# Patient Record
Sex: FEMALE | Race: OTHER | HISPANIC OR LATINO | URBAN - METROPOLITAN AREA
[De-identification: names, ages, dates, MRNs, and addresses within clinical notes are randomized per-mention and may not be internally consistent; named-entity substitution may affect disease eponyms.]

---

## 2019-06-17 ENCOUNTER — EMERGENCY (EMERGENCY)
Facility: HOSPITAL | Age: 24
LOS: 1 days | Discharge: HOME | End: 2019-06-17
Attending: EMERGENCY MEDICINE
Payer: SELF-PAY

## 2019-06-17 VITALS
HEIGHT: 64 IN | DIASTOLIC BLOOD PRESSURE: 83 MMHG | SYSTOLIC BLOOD PRESSURE: 131 MMHG | HEART RATE: 68 BPM | OXYGEN SATURATION: 100 % | WEIGHT: 167.99 LBS | TEMPERATURE: 98 F | RESPIRATION RATE: 18 BRPM

## 2019-06-17 PROCEDURE — 99053 MED SERV 10PM-8AM 24 HR FAC: CPT

## 2019-06-17 PROCEDURE — 99284 EMERGENCY DEPT VISIT MOD MDM: CPT | Mod: 25

## 2019-06-18 VITALS
OXYGEN SATURATION: 99 % | TEMPERATURE: 98 F | HEART RATE: 57 BPM | SYSTOLIC BLOOD PRESSURE: 110 MMHG | DIASTOLIC BLOOD PRESSURE: 53 MMHG | RESPIRATION RATE: 17 BRPM

## 2019-06-18 LAB
ALBUMIN SERPL ELPH-MCNC: 4.8 G/DL — SIGNIFICANT CHANGE UP (ref 3.3–5)
ALP SERPL-CCNC: 85 U/L — SIGNIFICANT CHANGE UP (ref 40–120)
ALT FLD-CCNC: 14 U/L — SIGNIFICANT CHANGE UP (ref 10–45)
ANION GAP SERPL CALC-SCNC: 14 MMOL/L — SIGNIFICANT CHANGE UP (ref 5–17)
AST SERPL-CCNC: 18 U/L — SIGNIFICANT CHANGE UP (ref 10–40)
BASOPHILS # BLD AUTO: 0 K/UL — SIGNIFICANT CHANGE UP (ref 0–0.2)
BASOPHILS NFR BLD AUTO: 0.7 % — SIGNIFICANT CHANGE UP (ref 0–2)
BILIRUB SERPL-MCNC: 0.2 MG/DL — SIGNIFICANT CHANGE UP (ref 0.2–1.2)
BUN SERPL-MCNC: 7 MG/DL — SIGNIFICANT CHANGE UP (ref 7–23)
CALCIUM SERPL-MCNC: 9.3 MG/DL — SIGNIFICANT CHANGE UP (ref 8.4–10.5)
CHLORIDE SERPL-SCNC: 104 MMOL/L — SIGNIFICANT CHANGE UP (ref 96–108)
CO2 SERPL-SCNC: 22 MMOL/L — SIGNIFICANT CHANGE UP (ref 22–31)
CREAT SERPL-MCNC: 0.52 MG/DL — SIGNIFICANT CHANGE UP (ref 0.5–1.3)
EOSINOPHIL # BLD AUTO: 0.3 K/UL — SIGNIFICANT CHANGE UP (ref 0–0.5)
EOSINOPHIL NFR BLD AUTO: 4.4 % — SIGNIFICANT CHANGE UP (ref 0–6)
GLUCOSE SERPL-MCNC: 95 MG/DL — SIGNIFICANT CHANGE UP (ref 70–99)
HCG SERPL-ACNC: <2 MIU/ML — SIGNIFICANT CHANGE UP
HCT VFR BLD CALC: 36.1 % — SIGNIFICANT CHANGE UP (ref 34.5–45)
HGB BLD-MCNC: 12.2 G/DL — SIGNIFICANT CHANGE UP (ref 11.5–15.5)
LYMPHOCYTES # BLD AUTO: 2.2 K/UL — SIGNIFICANT CHANGE UP (ref 1–3.3)
LYMPHOCYTES # BLD AUTO: 33.2 % — SIGNIFICANT CHANGE UP (ref 13–44)
MCHC RBC-ENTMCNC: 27.8 PG — SIGNIFICANT CHANGE UP (ref 27–34)
MCHC RBC-ENTMCNC: 34 GM/DL — SIGNIFICANT CHANGE UP (ref 32–36)
MCV RBC AUTO: 81.9 FL — SIGNIFICANT CHANGE UP (ref 80–100)
MONOCYTES # BLD AUTO: 0.5 K/UL — SIGNIFICANT CHANGE UP (ref 0–0.9)
MONOCYTES NFR BLD AUTO: 7.6 % — SIGNIFICANT CHANGE UP (ref 2–14)
NEUTROPHILS # BLD AUTO: 3.6 K/UL — SIGNIFICANT CHANGE UP (ref 1.8–7.4)
NEUTROPHILS NFR BLD AUTO: 54.1 % — SIGNIFICANT CHANGE UP (ref 43–77)
PLATELET # BLD AUTO: 202 K/UL — SIGNIFICANT CHANGE UP (ref 150–400)
POTASSIUM SERPL-MCNC: 3.4 MMOL/L — LOW (ref 3.5–5.3)
POTASSIUM SERPL-SCNC: 3.4 MMOL/L — LOW (ref 3.5–5.3)
PROT SERPL-MCNC: 7.7 G/DL — SIGNIFICANT CHANGE UP (ref 6–8.3)
RBC # BLD: 4.4 M/UL — SIGNIFICANT CHANGE UP (ref 3.8–5.2)
RBC # FLD: 13.7 % — SIGNIFICANT CHANGE UP (ref 10.3–14.5)
SODIUM SERPL-SCNC: 140 MMOL/L — SIGNIFICANT CHANGE UP (ref 135–145)
WBC # BLD: 6.6 K/UL — SIGNIFICANT CHANGE UP (ref 3.8–10.5)
WBC # FLD AUTO: 6.6 K/UL — SIGNIFICANT CHANGE UP (ref 3.8–10.5)

## 2019-06-18 PROCEDURE — 76830 TRANSVAGINAL US NON-OB: CPT | Mod: 26

## 2019-06-18 PROCEDURE — 93975 VASCULAR STUDY: CPT | Mod: 26

## 2019-06-18 RX ORDER — KETOROLAC TROMETHAMINE 30 MG/ML
30 SYRINGE (ML) INJECTION ONCE
Refills: 0 | Status: DISCONTINUED | OUTPATIENT
Start: 2019-06-18 | End: 2019-06-18

## 2019-06-18 RX ADMIN — Medication 30 MILLIGRAM(S): at 01:59

## 2019-06-18 NOTE — ED PROVIDER NOTE - ATTENDING CONTRIBUTION TO CARE
Attending MD Adriana Gregorio:  I personally have seen and examined this patient.  Resident note reviewed and agree on plan of care and except where noted.  See HPI, PE, and MDM for details.

## 2019-06-18 NOTE — ED PROVIDER NOTE - MUSCULOSKELETAL, MLM
Strength appropriate for age and habitus. Full active range of motion of all 4 extremities. No joint swelling, calor, or deformities. No calf swelling or tenderness. No C-Spine tenderness.

## 2019-06-18 NOTE — ED PROVIDER NOTE - PHYSICAL EXAMINATION
Attending Adriana Gregorio: Gen: NAD, heent: atrauamtic, eomi, perrla, mmm, op pink, uvula midline, neck; nttp, no nuchal rigidity, chest: nttp, no crepitus, cv: rrr, no murmurs, lungs: ctab, abd: soft, mild ttp rlq, no murphys, negative mcburrneys, no peritoneal signs, +BS, no guarding, ext: wwp, neg homans, skin: no rash, neuro: awake and alert, following commands, speech clear, sensation and strength intact, no focal deficits

## 2019-06-18 NOTE — ED ADULT NURSE NOTE - NSIMPLEMENTINTERV_GEN_ALL_ED
Implemented All Fall Risk Interventions:  [place] to call system. Call bell, personal items and telephone within reach. Instruct patient to call for assistance. Room bathroom lighting operational. Non-slip footwear when patient is off stretcher. Physically safe environment: no spills, clutter or unnecessary equipment. Stretcher in lowest position, wheels locked, appropriate side rails in place. Provide visual cue, wrist band, yellow gown, etc. Monitor gait and stability. Monitor for mental status changes and reorient to person, place, and time. Review medications for side effects contributing to fall risk. Reinforce activity limits and safety measures with patient and family.

## 2019-06-18 NOTE — ED PROVIDER NOTE - OBJECTIVE STATEMENT
23 y.o female no PMH presents with syncope. Per patient, been having some severe right lower quadrant pain all day. At 9 pm, pain was severe, she felt lightheaded and had syncopal event while sitting in chair, witnessed by friends. Did not hit head. No CP or palpitations prior to the fall. States came to ED now because of persistent pain in the right lower quadrant. Pain sharp, non radiating. States that she has a histroy of "inflamed ovaries." LMP May 16th.  Took ibuprofen w/ mild relief. Pain right now 5/10. No vaginal discharge or odor. No family hx of sudden cardiac death. No hx of DVT or PE. Has an IUD not on OCP. No fevers, N/V/D. Pain been going on for many weeks.

## 2019-06-18 NOTE — ED PROVIDER NOTE - NSFOLLOWUPINSTRUCTIONS_ED_ALL_ED_FT
1) Please return to the ED should you have any new or worsening symptoms, worsening pain, develop chest pain, difficulty breathing, or fever, or any concerning symptoms  2) Please follow up with your primary care doctor in 2-3 days.

## 2019-06-18 NOTE — ED ADULT NURSE NOTE - OBJECTIVE STATEMENT
pt reports "fainting" at about 9:30 pm while with friends; was standing; reports feeling dizzy and weak prior to fainting; denies headstrike; has severe RLQ pain x 2 days; was mild for one month; hx inflammation of right ovary; LMP May 16th; Has IUD in place; pt reports her ankles seem swollen; EKG done in waiting room and placed on tele box upon arrival to unit; pt is alert and oriented x 3 and in no acute distress.

## 2019-06-18 NOTE — ED ADULT NURSE REASSESSMENT NOTE - NS ED NURSE REASSESS COMMENT FT1
Pt returned from US. Appears to be in no current distress. Pt aware urine is needed at this time. Awaiting US results. Safety and comfort measures maintained.

## 2019-06-18 NOTE — ED ADULT NURSE REASSESSMENT NOTE - NS ED NURSE REASSESS COMMENT FT1
Report received from TORO Torres. Upon reassessment pt assisted to bathroom and back. Transport at bedside to take pt to ultrasound. Will reassess upon return of pt to ED. Safety and comfort measures maintained. Pt aware of admission. Report received from TORO Torres. Transport at bedside to take pt to ultrasound. Will reassess upon return of pt to ED.

## 2019-06-18 NOTE — ED PROVIDER NOTE - CLINICAL SUMMARY MEDICAL DECISION MAKING FREE TEXT BOX
Angelito Estevez (Resident): few weeks of intermittent right lower quadrant pain - syncope today while having the pain, no prodrome of symptoms prior to the syncope other than lightheadedness, no CP, no reason for DVT or PE, PERC Negative - concern for possible ovarian pathology including torsion vs cyst rupture - will check labs, TVUS, and reassess - story more c/w ovarian than appy given no secondary signs of appendicitis and intermittent for some time Angelito Estevez (Resident): few weeks of intermittent right lower quadrant pain - syncope today while having the pain, no prodrome of symptoms prior to the syncope other than lightheadedness, no CP, no reason for DVT or PE, PERC Negative - concern for possible ovarian pathology including torsion vs cyst rupture - will check labs, TVUS, and reassess - story more c/w ovarian than appy given no secondary signs of appendicitis and intermittent for some time  Attending Adriana Gregorio: 22 y/o female presenting with lower abdominal pain. no vaginal discharge or h/o STD. pt with RLQ pain. less likely appendicitis. consider ovarian cyst as cause as well. no hematuria or h/o uretaral colic. will obtain labs, tvus and serial abdominal exams. consider ct if persistentyly tender to evaluate for appendicitis

## 2019-06-18 NOTE — ED PROVIDER NOTE - PROGRESS NOTE DETAILS
Angelito Estevez (Resident): pain improved - still mildly tender - TVUS WNl - d/w patient can not rule out appy w/o a CT but given no N/V fever and normal WBC, very low concern - reviewed precautions about appendicitis and torsion with patient and if any worsening symptoms to return to the ED - safe to d/c home - given chance to ask questions - gave copy of results - EKG WNL, no syncope or events on monitor since being here, likely was 2/2 to the pain Attending Adriana Gregorio: u/s negative. on repeat exam abd soft, d/w pt appendicitis precautions. pt ambujlating without any pain. low risk for PID and no vaginal discharge. given close return precatuions for any changes or worsening

## 2020-04-09 ENCOUNTER — EMERGENCY (EMERGENCY)
Facility: HOSPITAL | Age: 25
LOS: 1 days | Discharge: HOME | End: 2020-04-09
Attending: EMERGENCY MEDICINE
Payer: MEDICAID

## 2020-04-09 VITALS
OXYGEN SATURATION: 95 % | RESPIRATION RATE: 22 BRPM | HEART RATE: 130 BPM | HEIGHT: 64 IN | SYSTOLIC BLOOD PRESSURE: 146 MMHG | WEIGHT: 175.05 LBS | DIASTOLIC BLOOD PRESSURE: 82 MMHG | TEMPERATURE: 101 F

## 2020-04-09 VITALS
HEART RATE: 84 BPM | TEMPERATURE: 99 F | SYSTOLIC BLOOD PRESSURE: 110 MMHG | OXYGEN SATURATION: 99 % | DIASTOLIC BLOOD PRESSURE: 65 MMHG | RESPIRATION RATE: 22 BRPM

## 2020-04-09 PROCEDURE — 99284 EMERGENCY DEPT VISIT MOD MDM: CPT

## 2020-04-09 PROCEDURE — 87635 SARS-COV-2 COVID-19 AMP PRB: CPT

## 2020-04-09 RX ORDER — ACETAMINOPHEN 500 MG
2 TABLET ORAL
Qty: 112 | Refills: 0
Start: 2020-04-09 | End: 2020-04-22

## 2020-04-09 RX ORDER — ACETAMINOPHEN 500 MG
975 TABLET ORAL ONCE
Refills: 0 | Status: COMPLETED | OUTPATIENT
Start: 2020-04-09 | End: 2020-04-09

## 2020-04-09 RX ADMIN — Medication 975 MILLIGRAM(S): at 22:09

## 2020-04-09 NOTE — ED PROVIDER NOTE - PHYSICAL EXAMINATION
CONSTITUTIONAL: Nontoxic, well nourished, well developed, young female, anxious   HEAD: Normocephalic; atraumatic  EYES: Normal inspection, EOMI  ENMT: External appears normal; normal oropharynx  NECK: Supple; non-tender; no cervical lymphadenopathy  CARD: tachycardic, no audible murmurs, rubs, or gallops  RESP: mild respiratory distress, lungs ctab/l, 97% on RA, ambulating sat 95%   ABD: Soft, non-distended; non-tender; no rebound or guarding  EXT: No LE pitting edema or calf tenderness; distal pulses intact with good capillary refill  SKIN: Warm, dry, intact  NEURO: aaox3, moving all extremities spontaneously

## 2020-04-09 NOTE — ED PROVIDER NOTE - CCCP TRG CHIEF CMPLNT
How Severe Are Your Spot(S)?: mild
What Is The Reason For Today's Visit?: Full Body Skin Examination
What Is The Reason For Today's Visit? (Being Monitored For X): the risk of recurrence of previously treated lesion(s)
Additional History: No concerns. FBE.
shortness of breath

## 2020-04-09 NOTE — ED PROVIDER NOTE - ATTENDING CONTRIBUTION TO CARE
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 Febrile, tachycardic. Will treat fever, swab for COVID, dc with strict return precautions

## 2020-04-09 NOTE — ED PROVIDER NOTE - CLINICAL SUMMARY MEDICAL DECISION MAKING FREE TEXT BOX
Sydni Avilez MD: 24yo F with no significant PMH who presents with a fever, cough, shortness of breath and body aches over the last 3 days. [employer] employee. Pt is tachycardic and in mild respiratory distress but not hypoxic at rest or ambulating. Will get COVID swab, treat fever with tylenol and reassess.

## 2020-04-09 NOTE — ED PROVIDER NOTE - NS ED ROS FT
General: +fever, chills  HENT: denies nasal congestion, sore throat, rhinorrhea  Eyes: denies vision changes  CV: denies chest pain  Resp: +difficulty breathing, +cough  Abdominal: +nausea, vomiting, diarrhea, abdominal pain, blood in stool, dark stool  : denies pain with urination  MSK: denies recent trauma  Neuro: denies headaches, numbness, tingling, dizziness, lightheadedness.  Skin: denies new rashes  Endocrine: denies recent weight loss

## 2020-04-09 NOTE — ED PROVIDER NOTE - PATIENT PORTAL LINK FT
You can access the FollowMyHealth Patient Portal offered by [facility] by registering at the following website: [email or web address]. By joining [facility]’s FollowMyHealth portal, you will also be able to view your health information using other applications (apps) compatible with our system.

## 2020-04-09 NOTE — ED PROVIDER NOTE - NSFOLLOWUPINSTRUCTIONS_ED_ALL_ED_FT
1. You were seen in the ED and underwent testing for the novel coronarvirus (COVID-19). The results are not back yet and you will be contacted with the result which should return in 24-48 hours, but may take longer. You were also tested for other common viruses such as the flu and cold viruses. You will be notified if you test positive for any of these.    2. Until your test results are back, YOU MUST SELF-QUARANTINE until you are told to other otherwise by [facility] or the local [facility] department. This is extremely important to limit the spread of this virus. Please refer closely to the packet provided to you on the specifics of the process of self-quarantine.    3. If you end up testing positive for the virus, you will instructed as to when you can return to your usual activities. If you do not hear from anyone in 7 days, please call the main hospital number or your local health department for guidance.     4. Return to the ED for difficulty breathing.    5. You may over the counter acetaminophen (Tylenol) 650mg every 6 hours as needed for fever or pain. There is some concern in the medical community about using ibuprofen (Advil, Motrin) and other NSAIDs in people with COVID infections and until there is more research on this subject it may be best to avoid NSAIDs like ibuprofen at the moment unless you have an allergy to acetaminophen (Tylenol).  Do NOT exceed 3500mg acetaminophen in 24 hours.  Please do not take these medications if you do not have pain or fever or if you have any history of liver disease.     -------------    What is a coronavirus?  Coronaviruses are a large family of viruses that cause illnesses ranging from the common cold to more severe diseases such as Middle East Respiratory Syndrome (MERS) and Severe Acute Respiratory Syndrome (SARS).    What is Novel Coronavirus (COVID-19)?  The Centers for Disease Control and Prevention (CDC) is closely monitoring the outbreak caused by COVID-19. For the latest information about COVID-19, visit the CDC website at CDC.gov/Coronavirus    How are coronaviruses spread?  Coronaviruses can be transmitted from person-toperson, usually after close contact with an infected  person (for example, in a household, workplace, or healthcare setting), via droplets that become airborne after a cough or sneeze. These droplets can then infect a nearby person. Transmission can also occur by touching recently contaminated surfaces.    Is there a treatment for a COVID-19?  There is no specific treatment for disease caused by COVID-19. However, many of the symptoms can be treated based on the patient’s clinical condition. Supportive care for infected persons can be highly effective.    What are the symptoms of coronavirus infection?  It depends on the virus, but common signs include fever and/or respiratory symptoms such as cough and shortness of breath. In more severe cases, infection can cause pneumonia, severe acute respiratory syndrome, kidney failure and even death. Fortunately, most cases of COVID-19 have an illness no different than the influenza (flu), with a majority of these patients having mild symptoms and overall mortality which appears to be not much different than the flu.    What can I do to protect myself?  The best precautionary measures:  – washing your hands  – covering your cough  – disinfecting surfaces  – it is also advisable to avoid close contact with anyone showing symptoms of respiratory illness such as coughing and sneezing  – those with symptoms should wear a surgical mask when around others    What can I do to protect those around me?  If you have been identified as someone who may be infected with COVID-19, we recommend you follow the self-isolation procedures outlined on the following page to protect those around you and to limit the spread of this virus.    We recommend the below precautionary steps from now until 14 days from when you returned from your travel or date of your last known possible contact:    — Do not go to work, school or public areas. Avoid using public transportation, ridesharing or taxis.  — As much as possible, separate yourself from other people in your home. If you can, you should stay in a room and away from other people. Also, you should use a separate bathroom if available.  — Wear the supplied mask whenever you are around other people.  — If you have a non-urgent medical appointment, please reschedule for a later date. If the appointment is urgent, please call the health care provider and tell them that you are on self-isolation for possible COVID-19. This will help the health care provider’s office take steps to keep other people from getting infected or exposed. If you can reschedule routine appointments, do so.  — Wash your hands often with soap and water for at least 15 to 20 seconds or clean your hands with an alcohol-based hand  that contains 60 to 95% alcohol, covering all surfaces of your hands and rubbing them together until they feel dry. Soap and water should be used preferentially if hands are visibly dirty.  — Cover your mouth and nose with a tissue when you cough or sneeze. Throw used tissues in a lined trash can. Immediately wash your hands.  — Avoid touching your eyes, nose, and mouth with your hands.  — Avoid sharing personal household items. You should not share dishes, drinking glasses, cups, eating utensils, towels, or bedding with other people or pets in your home. After using these items, they should be washed thoroughly with soap and water.  — Clean and disinfect all “high-touch” surfaces every day. High touch surfaces include counters, tabletops, doorknobs, light switches, remote controls, bathroom fixtures, toilets, phones, keyboards, tablets, and bedside tables. Also, clean any surfaces that may have blood, stool, or body fluids on them.    ------------------------------------------  Information for patients who have received a COVID-19 test.    The COVID-19 (novel coronavirus) test  Results may take up to 5-7 days to become available.      If your result is positive, you will receive a phone call from one of our coronavirus specialists. While we will do our best to also call patients with a negative test result, the sheer volume of tests being performed may make this difficult to do in a timely fashion. If you haven’t heard from us within 5 days and you’d like to check on your results, you can check our [employer] [facility]iana or call one of our coronavirus specialists at [address] [place] [address] (available 24/7)    Please DO NOT call the site where you received the test to obtain your results.    If the test is positive -   You will continue home isolation until you are completely well, you have no fever, and it has been at least 14 days since your positive test. The health department in your city or county may also contact you with additional instructions.    If your test is negative -    You will be able to stop home isolation and resume standard precautions, similar to how you would manage the common cold or flu.  If you have any questions, you can reach out to one of our coronavirus specialists  at [address] [place] [address].    REMEMBER - a negative COVID test means you were negative AT THE TIME OF TESTING, and it is possible to have contracted COVID after being tested.

## 2020-04-09 NOTE — ED PROVIDER NOTE - OBJECTIVE STATEMENT
Sydni Avilez MD: 26yo F with no significant PMH who presents with a fever, cough, shortness of breath and body aches over the last 3 days. Symptoms have been getting progressively worse. SOB is present at rest and with exertion. +sick contacts in friends suspected COVID. Recent COVID swab neg 1 week ago. Admits to nausea. No vomiting, abd pain, diarrhea, chest pain. Pt is [employer] employee. Non smoker.

## 2020-04-10 LAB — SARS-COV-2 RNA SPEC QL NAA+PROBE: DETECTED

## 2020-04-11 ENCOUNTER — INPATIENT (INPATIENT)
Facility: HOSPITAL | Age: 25
LOS: 2 days | Discharge: HOME | DRG: 177 | End: 2020-04-14
Attending: HOSPITALIST | Admitting: HOSPITALIST
Payer: MEDICAID

## 2020-04-11 VITALS
SYSTOLIC BLOOD PRESSURE: 102 MMHG | TEMPERATURE: 99 F | HEIGHT: 64 IN | WEIGHT: 175.05 LBS | RESPIRATION RATE: 30 BRPM | OXYGEN SATURATION: 90 % | DIASTOLIC BLOOD PRESSURE: 39 MMHG | HEART RATE: 109 BPM

## 2020-04-11 DIAGNOSIS — R68.89 OTHER GENERAL SYMPTOMS AND SIGNS: ICD-10-CM

## 2020-04-11 LAB
ALBUMIN SERPL ELPH-MCNC: 4.3 G/DL — SIGNIFICANT CHANGE UP (ref 3.3–5)
ALP SERPL-CCNC: 71 U/L — SIGNIFICANT CHANGE UP (ref 40–120)
ALT FLD-CCNC: 25 U/L — SIGNIFICANT CHANGE UP (ref 10–45)
ANION GAP SERPL CALC-SCNC: 18 MMOL/L — HIGH (ref 5–17)
APTT BLD: 34.2 SEC — SIGNIFICANT CHANGE UP (ref 27.5–36.3)
AST SERPL-CCNC: 36 U/L — SIGNIFICANT CHANGE UP (ref 10–40)
BASOPHILS # BLD AUTO: 0 K/UL — SIGNIFICANT CHANGE UP (ref 0–0.2)
BASOPHILS NFR BLD AUTO: 0 % — SIGNIFICANT CHANGE UP (ref 0–2)
BILIRUB SERPL-MCNC: 0.2 MG/DL — SIGNIFICANT CHANGE UP (ref 0.2–1.2)
BUN SERPL-MCNC: 5 MG/DL — LOW (ref 7–23)
CALCIUM SERPL-MCNC: 9.1 MG/DL — SIGNIFICANT CHANGE UP (ref 8.4–10.5)
CHLORIDE SERPL-SCNC: 104 MMOL/L — SIGNIFICANT CHANGE UP (ref 96–108)
CO2 SERPL-SCNC: 22 MMOL/L — SIGNIFICANT CHANGE UP (ref 22–31)
CREAT SERPL-MCNC: 0.41 MG/DL — LOW (ref 0.5–1.3)
D DIMER BLD IA.RAPID-MCNC: 183 NG/ML DDU — SIGNIFICANT CHANGE UP
EOSINOPHIL # BLD AUTO: 0.06 K/UL — SIGNIFICANT CHANGE UP (ref 0–0.5)
EOSINOPHIL NFR BLD AUTO: 1.8 % — SIGNIFICANT CHANGE UP (ref 0–6)
GLUCOSE SERPL-MCNC: 92 MG/DL — SIGNIFICANT CHANGE UP (ref 70–99)
HCT VFR BLD CALC: 40.8 % — SIGNIFICANT CHANGE UP (ref 34.5–45)
HGB BLD-MCNC: 12.8 G/DL — SIGNIFICANT CHANGE UP (ref 11.5–15.5)
INR BLD: 1.23 RATIO — HIGH (ref 0.88–1.16)
LDH SERPL L TO P-CCNC: 414 U/L — HIGH (ref 50–242)
LYMPHOCYTES # BLD AUTO: 0.7 K/UL — LOW (ref 1–3.3)
LYMPHOCYTES # BLD AUTO: 21.1 % — SIGNIFICANT CHANGE UP (ref 13–44)
MANUAL SMEAR VERIFICATION: SIGNIFICANT CHANGE UP
MCHC RBC-ENTMCNC: 25 PG — LOW (ref 27–34)
MCHC RBC-ENTMCNC: 31.4 GM/DL — LOW (ref 32–36)
MCV RBC AUTO: 79.7 FL — LOW (ref 80–100)
MONOCYTES # BLD AUTO: 0.23 K/UL — SIGNIFICANT CHANGE UP (ref 0–0.9)
MONOCYTES NFR BLD AUTO: 7 % — SIGNIFICANT CHANGE UP (ref 2–14)
NEUTROPHILS # BLD AUTO: 2.08 K/UL — SIGNIFICANT CHANGE UP (ref 1.8–7.4)
NEUTROPHILS NFR BLD AUTO: 59.6 % — SIGNIFICANT CHANGE UP (ref 43–77)
NEUTS BAND # BLD: 3.5 % — SIGNIFICANT CHANGE UP (ref 0–8)
PLAT MORPH BLD: NORMAL — SIGNIFICANT CHANGE UP
PLATELET # BLD AUTO: 181 K/UL — SIGNIFICANT CHANGE UP (ref 150–400)
POTASSIUM SERPL-MCNC: 3.9 MMOL/L — SIGNIFICANT CHANGE UP (ref 3.5–5.3)
POTASSIUM SERPL-SCNC: 3.9 MMOL/L — SIGNIFICANT CHANGE UP (ref 3.5–5.3)
PROCALCITONIN SERPL-MCNC: 0.06 NG/ML — SIGNIFICANT CHANGE UP (ref 0.02–0.1)
PROT SERPL-MCNC: 7.7 G/DL — SIGNIFICANT CHANGE UP (ref 6–8.3)
PROTHROM AB SERPL-ACNC: 14.1 SEC — HIGH (ref 10–12.9)
RBC # BLD: 5.12 M/UL — SIGNIFICANT CHANGE UP (ref 3.8–5.2)
RBC # FLD: 14.6 % — HIGH (ref 10.3–14.5)
RBC BLD AUTO: SIGNIFICANT CHANGE UP
SODIUM SERPL-SCNC: 144 MMOL/L — SIGNIFICANT CHANGE UP (ref 135–145)
TROPONIN T, HIGH SENSITIVITY RESULT: 8 NG/L — SIGNIFICANT CHANGE UP (ref 0–51)
VARIANT LYMPHS # BLD: 7 % — HIGH (ref 0–6)
WBC # BLD: 3.3 K/UL — LOW (ref 3.8–10.5)
WBC # FLD AUTO: 3.3 K/UL — LOW (ref 3.8–10.5)

## 2020-04-11 PROCEDURE — 71045 X-RAY EXAM CHEST 1 VIEW: CPT | Mod: 26

## 2020-04-11 PROCEDURE — 99233 SBSQ HOSP IP/OBS HIGH 50: CPT

## 2020-04-11 PROCEDURE — 93010 ELECTROCARDIOGRAM REPORT: CPT

## 2020-04-11 PROCEDURE — 99285 EMERGENCY DEPT VISIT HI MDM: CPT

## 2020-04-11 RX ORDER — ACETAMINOPHEN 500 MG
650 TABLET ORAL EVERY 4 HOURS
Refills: 0 | Status: DISCONTINUED | OUTPATIENT
Start: 2020-04-11 | End: 2020-04-14

## 2020-04-11 RX ORDER — ENOXAPARIN SODIUM 100 MG/ML
40 INJECTION SUBCUTANEOUS EVERY 12 HOURS
Refills: 0 | Status: DISCONTINUED | OUTPATIENT
Start: 2020-04-11 | End: 2020-04-14

## 2020-04-11 RX ORDER — GUAIFENESIN/DEXTROMETHORPHAN 600MG-30MG
10 TABLET, EXTENDED RELEASE 12 HR ORAL EVERY 4 HOURS
Refills: 0 | Status: DISCONTINUED | OUTPATIENT
Start: 2020-04-11 | End: 2020-04-14

## 2020-04-11 RX ADMIN — ENOXAPARIN SODIUM 40 MILLIGRAM(S): 100 INJECTION SUBCUTANEOUS at 23:25

## 2020-04-11 RX ADMIN — Medication 650 MILLIGRAM(S): at 22:00

## 2020-04-11 NOTE — H&P ADULT - PROBLEM SELECTOR PLAN 2
--PCR positive (under other MRN [id number])  --QTc 420  --wean oxygen as tolerated  --inflammatory marker minimally elevated --PCR positive (under other MRN [id number])  --QTc 420, will start plaquenil.   --wean oxygen as tolerated  --inflammatory marker minimally elevated

## 2020-04-11 NOTE — ED PROVIDER NOTE - PHYSICAL EXAMINATION
PHYSICAL EXAM:  GENERAL: non-toxic appearing; in no respiratory distress  HEAD: Atraumatic, Normocephalic;  NECK: No JVD; FROM  EYES: PERRL, EOMs intact b/l w/out deficits  CHEST/LUNG: diffuse rhonchi; tachypneic; sating 88% on RA, improved to >95% on 2L NC  HEART: RRR no murmur/gallops/rubs  ABDOMEN: +BS, soft, NT, ND  EXTREMITIES: No LE edema, +2 radial pulses b/l  MUSCULOSKELETAL: FROM of all 4 extremities;  NERVOUS SYSTEM:  A&Ox3, No motor deficits or sensory deficits; CNII-XII intact; no focal neurologic deficits  SKIN:  No new rashes

## 2020-04-11 NOTE — ED PROVIDER NOTE - PROGRESS NOTE DETAILS
Attending note (Liborio): patient resting comfortably; noted to at times be mildly tachypneic to low 20s RR, but maintaining O2 at mid 90s with now 2L NC. (down from 4L NC).  Last attempt at RA sat at rest had O2 sat 88%.

## 2020-04-11 NOTE — H&P ADULT - NSHPSOCIALHISTORY_GEN_ALL_CORE
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

## 2020-04-11 NOTE — ED PROVIDER NOTE - CARE PLAN
Principal Discharge DX:	Suspected 2019 novel coronavirus infection  Secondary Diagnosis:	Acute hypoxemic respiratory failure

## 2020-04-11 NOTE — H&P ADULT - ATTENDING COMMENTS
Admit Diagnoses  - Acute Respiratory Failure with Hypoxia- J96.01  - Pneumonia Case confirmed as due to the 2019 novel coronavirus (COVID-19)- J12.89  - Confirmed COVID19- U07.1    Bill as F/U [facility]- [id number]

## 2020-04-11 NOTE — H&P ADULT - NSHPREVIEWOFSYSTEMS_GEN_ALL_CORE
Review of Systems:   CONSTITUTIONAL: +fever, fatigue  EYES: No eye pain, visual disturbances, or discharge  ENMT:  No difficulty hearing, tinnitus, vertigo; No sinus or throat pain  NECK: No pain or stiffness  RESPIRATORY: +cough, SOB  CARDIOVASCULAR: +pleuritic chest pain; No palpitations, dizziness, or leg swelling  GASTROINTESTINAL: +nausea, vomiting, diarrhea; No abdominal or epigastric pain. No hematemesis; No melena or hematochezia.  GENITOURINARY: No dysuria, frequency, hematuria, or incontinence  NEUROLOGICAL: No headaches, memory loss, loss of strength, numbness, or tremors  SKIN: No itching, burning, rashes, or lesions   ENDOCRINE: No heat or cold intolerance; No hair loss  MUSCULOSKELETAL: +myalgias  PSYCHIATRIC: No depression, anxiety, mood swings, or difficulty sleeping  HEME/LYMPH: No easy bruising, or bleeding gums

## 2020-04-11 NOTE — ED PROVIDER NOTE - ATTENDING CONTRIBUTION TO CARE
Attending Statement (GIOVANNY Capone MD):    HPI: 23y/o F with no reported PMH, c/o worsening SOB over the past 4 days; in setting of cough, fever, fatigue and diarrhea; positive for COVID 19 tested here yesterday (under different MRN).  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  Denies smoking/vaping/drug use.    A 14-point ROS was performed with pertinent information as noted above    PSH/PMH as noted above     On Physical Exam:  General: ill appearing, tachypneic, hypoxic to 87% on RA, improving on 4L O2 via NC  HEENT: PERRL, MMM, airway patent, normal posterior pharynx  Neck: no neck tenderness, no nuchal rigidity  Cardiac: normal s1, s2; RRR; no MGR  Lungs: diffuse bilateral mid lung and bibasilar rales  Abdomen: soft nontender/nondistended  : no bladder tenderness or distension  Skin: intact, no rash  Extremities: no peripheral edema, no gross deformities  Neuro: no gross neurologic deficits    ECG shows NSR without st elevations or depressions, no pathologic t-wave inversions, QTC WNL    AP: Pt p/w hypoxic respiratory failure requiring supplemental oxygenation, likely due to COVID 19 infection; starting covid labs/work-up, including cbc, cmp, ferritin, ldh, esr, crp, d dimer and vbg, and will obtain CXR, continue on supplemental oxygen as needed, and plan for admission.

## 2020-04-11 NOTE — H&P ADULT - NSHPLABSRESULTS_GEN_ALL_CORE
EKG, labs, and imaging personally reviewed and interpreted.     EKG: NSR, nonischemic, QTc 420    LABS:             12.8   3.30  )-----------( 181      ( 11 Apr 2020 21:17 )             40.8     144  |  104  |  5<L>  ----------------------------<  92  3.9   |  22  |  0.41<L>    Ca    9.1      11 Apr 2020 21:17    TPro  7.7  /  Alb  4.3  /  TBili  0.2  /  DBili  x   /  AST  36  /  ALT  25  /  AlkPhos  71  04-11    PT/INR - ( 11 Apr 2020 21:17 )   PT: 14.1 sec;   INR: 1.23 ratio    PTT - ( 11 Apr 2020 21:17 )  PTT:34.2 sec    RADIOLOGY & ADDITIONAL TESTS:  Imaging Personally Reviewed:    CXR:   ******PRELIMINARY REPORT******         EXAM:  XR CHEST PORTABLE URGENT 1V                        PROCEDURE DATE:  04/11/2020    INTERPRETATION:  Bilateral patchy opacities compatible with pneumonia.    Consultant(s) Notes Reviewed:  Yes  Care Discussed with Consultants/Other Providers: Yes  Outpatient and prior hospitalization records reviewed: Yes

## 2020-04-11 NOTE — ED PROVIDER NOTE - OBJECTIVE STATEMENT
PLEASE NOTE THAT PATIENT WAS SEEN HERE 2 DAYS AGO (4/9/2020) UNDER "FREDDY CRESPO". HER MRN AT THAT TIME WAS [id number]    23 yo F with no significant pmhx presents to the ED c/o sob, cough, fever x4 days. pt was seen here in this ED 2 days ago and tested positive for covid19. pt also w/ one episode of nbnb emesis and few episodes of watery diarrhea. pt has cp midsternal upon coughing. pt works here in environmental services. Denies cigarette smokinr or vaping history  pt sating 88% on RA; improved to >95% on 2L NC.

## 2020-04-11 NOTE — ED PROVIDER NOTE - NS ED ROS FT
Constitutional: +fevers; no chills  HEENT: no visual changes, no sore throat, no rhinorrhea  CV: no cp or palpitations  Resp: +sob; +cough  GI: no abd pain, nausea, vomiting, diarrhea, no constipation  : no dysuria, no hematuria  MSK: no myalgais or arthralgias  skin: no rashes  neuro: no HA, no numbness; no weakness, no tingling  ROS statement: all other ROS negative except as per HPI

## 2020-04-11 NOTE — ED ADULT NURSE NOTE - NSIMPLEMENTINTERV_GEN_ALL_ED
Implemented All Universal Safety Interventions:  [place] to call system. Call bell, personal items and telephone within reach. Instruct patient to call for assistance. Room bathroom lighting operational. Non-slip footwear when patient is off stretcher. Physically safe environment: no spills, clutter or unnecessary equipment. Stretcher in lowest position, wheels locked, appropriate side rails in place.

## 2020-04-11 NOTE — H&P ADULT - HISTORY OF PRESENT ILLNESS
24F without significant PMH presents for SOB. Symptoms started 4-5 days ago. Symptoms include fever, cough, myalgias, and SOB. Also with nausea and one episode of NBNB emesis. Had a couple episodes of diarrhea. Also reports pleuritis midsternal chest pain with coughing, non-radiating. Pt is a [employer] employee, works in environmental services. Was here in the ED under MRN [id number] two days ago, tested positive for COVID19. No smoking or vaping history. Returned today due to worsening trouble breathing.

## 2020-04-11 NOTE — ED PROVIDER NOTE - UNABLE TO OBTAIN
patient brought back to [facility] room 38 by wheelchair, hypoxic to 87% on RA; improving to 90 on 2L O2, and 94% on 4L NC; tachypneic, speaking clearly but in short sentences. Urgent need for Intervention

## 2020-04-11 NOTE — ED PROVIDER NOTE - CLINICAL SUMMARY MEDICAL DECISION MAKING FREE TEXT BOX
Juan Olson MD. 25 yo F with no significant pmhx, tested positive for covid here 2 days ago, presents for worsening sob, cough, fevers x4 days. pt with nbnb emesis and watery diarrhea. pt tachypneic, diffuse crackles. sating 88% on RA, improved w/ 2L NC. will send for covid w/u and admit pt

## 2020-04-11 NOTE — H&P ADULT - ASSESSMENT
24F without significant PMH presents for SOB admitted with acute hypoxic respiratory failure due to COVID PNA.

## 2020-04-12 DIAGNOSIS — Z98.890 OTHER SPECIFIED POSTPROCEDURAL STATES: Chronic | ICD-10-CM

## 2020-04-12 DIAGNOSIS — Z29.9 ENCOUNTER FOR PROPHYLACTIC MEASURES, UNSPECIFIED: ICD-10-CM

## 2020-04-12 DIAGNOSIS — U07.1 COVID-19: ICD-10-CM

## 2020-04-12 DIAGNOSIS — J96.01 ACUTE RESPIRATORY FAILURE WITH HYPOXIA: ICD-10-CM

## 2020-04-12 LAB
ALBUMIN SERPL ELPH-MCNC: 4.2 G/DL — SIGNIFICANT CHANGE UP (ref 3.3–5)
ALP SERPL-CCNC: 63 U/L — SIGNIFICANT CHANGE UP (ref 40–120)
ALT FLD-CCNC: 21 U/L — SIGNIFICANT CHANGE UP (ref 10–45)
ANION GAP SERPL CALC-SCNC: 15 MMOL/L — SIGNIFICANT CHANGE UP (ref 5–17)
AST SERPL-CCNC: 33 U/L — SIGNIFICANT CHANGE UP (ref 10–40)
BILIRUB SERPL-MCNC: 0.2 MG/DL — SIGNIFICANT CHANGE UP (ref 0.2–1.2)
BUN SERPL-MCNC: 7 MG/DL — SIGNIFICANT CHANGE UP (ref 7–23)
CALCIUM SERPL-MCNC: 9.1 MG/DL — SIGNIFICANT CHANGE UP (ref 8.4–10.5)
CHLORIDE SERPL-SCNC: 102 MMOL/L — SIGNIFICANT CHANGE UP (ref 96–108)
CO2 SERPL-SCNC: 23 MMOL/L — SIGNIFICANT CHANGE UP (ref 22–31)
CREAT SERPL-MCNC: 0.39 MG/DL — LOW (ref 0.5–1.3)
CRP SERPL-MCNC: 7.23 MG/DL — HIGH (ref 0–0.4)
FERRITIN SERPL-MCNC: 118 NG/ML — SIGNIFICANT CHANGE UP (ref 15–150)
GLUCOSE SERPL-MCNC: 78 MG/DL — SIGNIFICANT CHANGE UP (ref 70–99)
HCT VFR BLD CALC: 39.3 % — SIGNIFICANT CHANGE UP (ref 34.5–45)
HGB BLD-MCNC: 12.1 G/DL — SIGNIFICANT CHANGE UP (ref 11.5–15.5)
MCHC RBC-ENTMCNC: 24.7 PG — LOW (ref 27–34)
MCHC RBC-ENTMCNC: 30.8 GM/DL — LOW (ref 32–36)
MCV RBC AUTO: 80.2 FL — SIGNIFICANT CHANGE UP (ref 80–100)
NRBC # BLD: 0 /100 WBCS — SIGNIFICANT CHANGE UP (ref 0–0)
PLATELET # BLD AUTO: 186 K/UL — SIGNIFICANT CHANGE UP (ref 150–400)
POTASSIUM SERPL-MCNC: 3.8 MMOL/L — SIGNIFICANT CHANGE UP (ref 3.5–5.3)
POTASSIUM SERPL-SCNC: 3.8 MMOL/L — SIGNIFICANT CHANGE UP (ref 3.5–5.3)
PROT SERPL-MCNC: 7.5 G/DL — SIGNIFICANT CHANGE UP (ref 6–8.3)
RBC # BLD: 4.9 M/UL — SIGNIFICANT CHANGE UP (ref 3.8–5.2)
RBC # FLD: 14.6 % — HIGH (ref 10.3–14.5)
SODIUM SERPL-SCNC: 140 MMOL/L — SIGNIFICANT CHANGE UP (ref 135–145)
TROPONIN T, HIGH SENSITIVITY RESULT: 7 NG/L — SIGNIFICANT CHANGE UP (ref 0–51)
WBC # BLD: 4.09 K/UL — SIGNIFICANT CHANGE UP (ref 3.8–10.5)
WBC # FLD AUTO: 4.09 K/UL — SIGNIFICANT CHANGE UP (ref 3.8–10.5)

## 2020-04-12 PROCEDURE — 99233 SBSQ HOSP IP/OBS HIGH 50: CPT

## 2020-04-12 RX ORDER — HYDROXYCHLOROQUINE SULFATE 200 MG
TABLET ORAL
Refills: 0 | Status: DISCONTINUED | OUTPATIENT
Start: 2020-04-12 | End: 2020-04-14

## 2020-04-12 RX ORDER — HYDROXYCHLOROQUINE SULFATE 200 MG
400 TABLET ORAL EVERY 24 HOURS
Refills: 0 | Status: DISCONTINUED | OUTPATIENT
Start: 2020-04-13 | End: 2020-04-14

## 2020-04-12 RX ORDER — HYDROXYCHLOROQUINE SULFATE 200 MG
800 TABLET ORAL EVERY 24 HOURS
Refills: 0 | Status: COMPLETED | OUTPATIENT
Start: 2020-04-12 | End: 2020-04-12

## 2020-04-12 RX ADMIN — Medication 10 MILLILITER(S): at 01:42

## 2020-04-12 RX ADMIN — Medication 10 MILLILITER(S): at 22:12

## 2020-04-12 RX ADMIN — Medication 800 MILLIGRAM(S): at 01:31

## 2020-04-12 RX ADMIN — Medication 10 MILLILITER(S): at 12:07

## 2020-04-12 RX ADMIN — Medication 650 MILLIGRAM(S): at 14:39

## 2020-04-12 RX ADMIN — ENOXAPARIN SODIUM 40 MILLIGRAM(S): 100 INJECTION SUBCUTANEOUS at 11:51

## 2020-04-12 RX ADMIN — ENOXAPARIN SODIUM 40 MILLIGRAM(S): 100 INJECTION SUBCUTANEOUS at 22:12

## 2020-04-12 NOTE — PROGRESS NOTE ADULT - SUBJECTIVE AND OBJECTIVE BOX
Patient is a 24y old  Female who presents with a chief complaint of SOB (11 Apr 2020 22:42)      SUBJECTIVE / OVERNIGHT EVENTS:  no acute events overnight, SpO2 % on 2L NC  pt continues to endorse difficulty catching her breath and persistent coughs  thinks cough syrup might have helped her last night but unclear  has no appetite and poor po intake    ROS:  14 point ROS negative in detail except stated as above    MEDICATIONS  (STANDING):  enoxaparin Injectable 40 milliGRAM(s) SubCutaneous every 12 hours  hydroxychloroquine   Oral     MEDICATIONS  (PRN):  acetaminophen   Tablet .. 650 milliGRAM(s) Oral every 4 hours PRN Temp greater or equal to 38C (100.4F), Mild Pain (1 - 3), Moderate Pain (4 - 6)  guaifenesin/dextromethorphan  Syrup 10 milliLiter(s) Oral every 4 hours PRN Cough      CAPILLARY BLOOD GLUCOSE        I&O's Summary    11 Apr 2020 07:01  -  12 Apr 2020 07:00  --------------------------------------------------------  IN: 300 mL / OUT: 800 mL / NET: -500 mL    12 Apr 2020 07:01  -  12 Apr 2020 13:35  --------------------------------------------------------  IN: 177 mL / OUT: 1 mL / NET: 176 mL        PHYSICAL EXAM:  Vital Signs Last 24 Hrs  T(C): 37.1 (12 Apr 2020 08:22), Max: 38 (11 Apr 2020 20:58)  T(F): 98.8 (12 Apr 2020 08:22), Max: 100.4 (11 Apr 2020 20:58)  HR: 74 (12 Apr 2020 08:22) (64 - 109)  BP: 109/54 (12 Apr 2020 08:22) (102/39 - 123/76)  BP(mean): 78 (11 Apr 2020 23:27) (72 - 78)  RR: 18 (12 Apr 2020 08:22) (14 - 30)  SpO2: 99% (12 Apr 2020 08:22) (90% - 100%)    GENERAL: in mild distress, coughing  HEAD:  Atraumatic, Normocephalic  EYES: EOMI, PERRLA, conjunctiva and sclera clear  NECK: Supple, No JVD  CHEST/LUNG: Clear to auscultation bilaterally; No wheeze  HEART: Regular rate and rhythm; No murmurs, rubs, or gallops  ABDOMEN: Soft, Nontender, Nondistended; Bowel sounds present  EXTREMITIES:  2+ Peripheral Pulses, No clubbing, cyanosis, or edema  NEUROLOGY: AAOx3; non-focal  SKIN: No rashes or lesions    LABS:  personally reviewed                        12.1   4.09  )-----------( 186      ( 12 Apr 2020 06:44 )             39.3     04-12    140  |  102  |  7   ----------------------------<  78  3.8   |  23  |  0.39<L>    Ca    9.1      12 Apr 2020 06:44    TPro  7.5  /  Alb  4.2  /  TBili  0.2  /  DBili  x   /  AST  33  /  ALT  21  /  AlkPhos  63  04-12    PT/INR - ( 11 Apr 2020 21:17 )   PT: 14.1 sec;   INR: 1.23 ratio         PTT - ( 11 Apr 2020 21:17 )  PTT:34.2 sec          RADIOLOGY & ADDITIONAL TESTS:    Imaging Personally Reviewed:    Consultant(s) Notes Reviewed:      Care Discussed with Consultants/Other Providers:

## 2020-04-12 NOTE — PROGRESS NOTE ADULT - PROBLEM SELECTOR PLAN 2
--PCR positive (under other N [id number])  --continue Plaquenil as per protocol (QTc 420)  --wean oxygen as tolerated  --inflammatory marker minimally elevated  --no indication for steroids or anakinra at this time

## 2020-04-12 NOTE — PROGRESS NOTE ADULT - PROBLEM SELECTOR PLAN 1
--O2 sat <89% on RA, requiring 2-4L NC  --satting well on NC 2L SpO2 %  --wean off as tolerated  --secondary to COVID infection.

## 2020-04-13 LAB
CK MB CFR SERPL CALC: 1 NG/ML — SIGNIFICANT CHANGE UP (ref 0–3.8)
CK SERPL-CCNC: 94 U/L — SIGNIFICANT CHANGE UP (ref 25–170)
TROPONIN T, HIGH SENSITIVITY RESULT: 10 NG/L — SIGNIFICANT CHANGE UP (ref 0–51)

## 2020-04-13 PROCEDURE — 99233 SBSQ HOSP IP/OBS HIGH 50: CPT

## 2020-04-13 PROCEDURE — 93010 ELECTROCARDIOGRAM REPORT: CPT

## 2020-04-13 RX ADMIN — Medication 650 MILLIGRAM(S): at 20:58

## 2020-04-13 RX ADMIN — Medication 10 MILLILITER(S): at 10:23

## 2020-04-13 RX ADMIN — Medication 400 MILLIGRAM(S): at 05:30

## 2020-04-13 RX ADMIN — ENOXAPARIN SODIUM 40 MILLIGRAM(S): 100 INJECTION SUBCUTANEOUS at 10:22

## 2020-04-13 RX ADMIN — ENOXAPARIN SODIUM 40 MILLIGRAM(S): 100 INJECTION SUBCUTANEOUS at 20:58

## 2020-04-13 NOTE — PROGRESS NOTE ADULT - PROBLEM SELECTOR PLAN 2
PCR positive (under other N [id number])  --continue Plaquenil as per protocol (QTc 420)  --wean oxygen as tolerated  --inflammatory marker minimally elevated  --no indication for steroids or anakinra at this time

## 2020-04-13 NOTE — PROGRESS NOTE ADULT - PROBLEM SELECTOR PLAN 1
Acute RF is 2/2 COVID 19. On arrival O2 sat <89% on RA, now on 2L NC  - c/w O2 support, bronchodilators

## 2020-04-13 NOTE — PROGRESS NOTE ADULT - SUBJECTIVE AND OBJECTIVE BOX
Mohsin Khan, MD  Attending Physician, Division Of Hospital Medicine  Pager: [phone], Office: [phone]  Off hour pager: [phone]    Patient is a 24y old  Female who presents with a chief complaint of SOB     SUBJECTIVE / OVERNIGHT EVENTS:  Seen, examined this am  Resting in bed, feeling tired, and at time SOB. O2 sat 97% in 2L NC, Tmax 100.3F      MEDICATIONS  (STANDING):  enoxaparin Injectable 40 milliGRAM(s) SubCutaneous every 12 hours  hydroxychloroquine   Oral   hydroxychloroquine 400 milliGRAM(s) Oral every 24 hours    MEDICATIONS  (PRN):  acetaminophen   Tablet .. 650 milliGRAM(s) Oral every 4 hours PRN Temp greater or equal to 38C (100.4F), Mild Pain (1 - 3), Moderate Pain (4 - 6)  guaifenesin/dextromethorphan  Syrup 10 milliLiter(s) Oral every 4 hours PRN Cough      Vital Signs Last 24 Hrs  T(C): 37.2 (13 Apr 2020 09:37), Max: 37.5 (12 Apr 2020 17:49)  T(F): 99 (13 Apr 2020 09:37), Max: 99.5 (12 Apr 2020 17:49)  HR: 65 (13 Apr 2020 09:37) (65 - 76)  BP: 99/63 (13 Apr 2020 09:37) (99/63 - 103/63)  BP(mean): --  RR: 18 (13 Apr 2020 09:37) (18 - 18)  SpO2: 97% (13 Apr 2020 09:37) (96% - 98%)  CAPILLARY BLOOD GLUCOSE        I&O's Summary    12 Apr 2020 07:01  -  13 Apr 2020 07:00  --------------------------------------------------------  IN: 1217 mL / OUT: 0 mL / NET: 1217 mL    13 Apr 2020 07:01  -  13 Apr 2020 15:08  --------------------------------------------------------  IN: 720 mL / OUT: 0 mL / NET: 720 mL        PHYSICAL EXAM:-  GENERAL: NAD, well-developed  EYES: EOMI, PERRLA, conjunctiva and sclera clear  NECK: Supple, No JVD, no thyromegaly  CHEST/LUNG: Clear to auscultation bilaterally; No wheeze  HEART: Regular rate and rhythm; S1, S2 audible, No murmurs, rubs, or gallops  ABDOMEN: Soft, Nontender, Nondistended; Bowel sounds present  EXTREMITIES:  2+ Peripheral Pulses, No clubbing, cyanosis, or edema  NEURO: AAOx3, no focal deficit      LABS:                        12.1   4.09  )-----------( 186      ( 12 Apr 2020 06:44 )             39.3     04-12    140  |  102  |  7   ----------------------------<  78  3.8   |  23  |  0.39<L>    Ca    9.1      12 Apr 2020 06:44    TPro  7.5  /  Alb  4.2  /  TBili  0.2  /  DBili  x   /  AST  33  /  ALT  21  /  AlkPhos  63  04-12    PT/INR - ( 11 Apr 2020 21:17 )   PT: 14.1 sec;   INR: 1.23 ratio    PTT - ( 11 Apr 2020 21:17 )  PTT:34.2 sec      RADIOLOGY & ADDITIONAL TESTS:    Imaging Personally Reviewed: CXR

## 2020-04-14 VITALS — OXYGEN SATURATION: 98 % | RESPIRATION RATE: 18 BRPM

## 2020-04-14 LAB
ALBUMIN SERPL ELPH-MCNC: 3.8 G/DL — SIGNIFICANT CHANGE UP (ref 3.3–5)
ALP SERPL-CCNC: 69 U/L — SIGNIFICANT CHANGE UP (ref 40–120)
ALT FLD-CCNC: 19 U/L — SIGNIFICANT CHANGE UP (ref 10–45)
ANION GAP SERPL CALC-SCNC: 17 MMOL/L — SIGNIFICANT CHANGE UP (ref 5–17)
AST SERPL-CCNC: 28 U/L — SIGNIFICANT CHANGE UP (ref 10–40)
BILIRUB SERPL-MCNC: 0.3 MG/DL — SIGNIFICANT CHANGE UP (ref 0.2–1.2)
BUN SERPL-MCNC: 8 MG/DL — SIGNIFICANT CHANGE UP (ref 7–23)
CALCIUM SERPL-MCNC: 9.2 MG/DL — SIGNIFICANT CHANGE UP (ref 8.4–10.5)
CHLORIDE SERPL-SCNC: 100 MMOL/L — SIGNIFICANT CHANGE UP (ref 96–108)
CO2 SERPL-SCNC: 23 MMOL/L — SIGNIFICANT CHANGE UP (ref 22–31)
CREAT SERPL-MCNC: 0.41 MG/DL — LOW (ref 0.5–1.3)
CRP SERPL-MCNC: 8.2 MG/DL — HIGH (ref 0–0.4)
D DIMER BLD IA.RAPID-MCNC: <150 NG/ML DDU — SIGNIFICANT CHANGE UP
FERRITIN SERPL-MCNC: 138 NG/ML — SIGNIFICANT CHANGE UP (ref 15–150)
GLUCOSE SERPL-MCNC: 88 MG/DL — SIGNIFICANT CHANGE UP (ref 70–99)
POTASSIUM SERPL-MCNC: 3.8 MMOL/L — SIGNIFICANT CHANGE UP (ref 3.5–5.3)
POTASSIUM SERPL-SCNC: 3.8 MMOL/L — SIGNIFICANT CHANGE UP (ref 3.5–5.3)
PROT SERPL-MCNC: 7.9 G/DL — SIGNIFICANT CHANGE UP (ref 6–8.3)
SODIUM SERPL-SCNC: 140 MMOL/L — SIGNIFICANT CHANGE UP (ref 135–145)

## 2020-04-14 PROCEDURE — 99239 HOSP IP/OBS DSCHRG MGMT >30: CPT

## 2020-04-14 RX ORDER — ACETAMINOPHEN 500 MG
2 TABLET ORAL
Qty: 0 | Refills: 0 | DISCHARGE
Start: 2020-04-14

## 2020-04-14 RX ORDER — HYDROXYCHLOROQUINE SULFATE 200 MG
2 TABLET ORAL
Qty: 4 | Refills: 0
Start: 2020-04-14 | End: 2020-04-15

## 2020-04-14 RX ORDER — GUAIFENESIN/DEXTROMETHORPHAN 600MG-30MG
10 TABLET, EXTENDED RELEASE 12 HR ORAL
Qty: 0 | Refills: 0 | DISCHARGE
Start: 2020-04-14

## 2020-04-14 RX ADMIN — ENOXAPARIN SODIUM 40 MILLIGRAM(S): 100 INJECTION SUBCUTANEOUS at 10:19

## 2020-04-14 RX ADMIN — Medication 400 MILLIGRAM(S): at 06:02

## 2020-04-14 RX ADMIN — Medication 10 MILLILITER(S): at 10:18

## 2020-04-14 NOTE — DISCHARGE NOTE PROVIDER - NSDCMRMEDTOKEN_GEN_ALL_CORE_FT
acetaminophen 325 mg oral tablet: 2 tab(s) orally every 4 hours, As needed, Temp greater or equal to 38C (100.4F), Mild Pain (1 - 3), Moderate Pain (4 - 6)  guaifenesin-dextromethorphan 100 mg-10 mg/5 mL oral liquid: 10 milliliter(s) orally every 4 hours, As needed, Cough  hydroxychloroquine 200 mg oral tablet: 2 tab(s) orally once a day

## 2020-04-14 NOTE — DISCHARGE NOTE PROVIDER - HOSPITAL COURSE
24F without significant PMH presented with SOB for 4-5 days with fever, cough, and myalgias. Also with nausea and one episode of NBNB emesis with diarrhea. She also reported pleuritis midsternal chest pain with coughing, non-radiating. She is a [employer] employee, works in environmental services. Was here in the ED under MRN [id number] two days prior to admission and tested positive for COVID19. She did not have a smoking or vaping history. She returned to ED due to worsening trouble breathing. She was admitted to medicine and started on Plaquenil.  She required 2L of oxygen initially.  Inflammatory markers were mildly elevated.  She began to improve and oxygenation was adequate on room air.  No indications for anikinra or steroids during admission. 24F without significant PMH presented with SOB for 4-5 days with fever, cough, and myalgias. Also with nausea and one episode of NBNB emesis with diarrhea. She also reported pleuritis midsternal chest pain with coughing, non-radiating. She is a [employer] employee, works in environmental services. Was here in the ED under MRN [id number] two days prior to admission and tested positive for COVID19. She did not have a smoking or vaping history. She returned to ED due to worsening trouble breathing. She was admitted to medicine and started on Plaquenil.  She required 2L of oxygen initially.  Inflammatory markers were mildly elevated.  She began to improve and oxygenation was adequate on room air.  No indications for anikinra or steroids during admission. On day of discharge, the patient was tolerating diet, ambulating well with adequate oxygen saturation.  She will follow up in 1 week. This is a 24F without significant PMH presented with SOB for 4-5 days with fever, cough, and myalgias. Also with nausea and one episode of NBNB emesis with diarrhea. She also reported pleuritis midsternal chest pain with coughing, non-radiating. She is a [employer] employee, works in environmental services. Was here in the ED under MRN [id number] two days prior to admission and tested positive for COVID19. She did not have a smoking or vaping history. She returned to ED due to worsening trouble breathing.     Patient was admitted with acute hypoxic respiratory failure and started on Plaquenil.  She required 2L of oxygen initially.  Inflammatory markers were mildly elevated.  She began to improve and oxygenation was adequate on room air.  No indications for Anikinra or steroids given she didn't have cytokine storm on this admission. On day of discharge, the patient was tolerating diet, ambulating well with adequate oxygen saturation.  She was discharged with instructions per CDC guideline home and would follow up with her PCP in 1 week.

## 2020-04-14 NOTE — PROGRESS NOTE ADULT - PROBLEM SELECTOR PLAN 1
Improved oxygenation, Hypoxia resolved. O2 sat 97% in RA  - c/w bronchodilators prn, incentive spirometry

## 2020-04-14 NOTE — DISCHARGE NOTE NURSING/CASE MANAGEMENT/SOCIAL WORK - PATIENT PORTAL LINK FT
You can access the FollowMyHealth Patient Portal offered by [facility] by registering at the following website: [email or web address]. By joining [email or web address] FollowMyHealth portal, you will also be able to view your health information using other applications (apps) compatible with our system.

## 2020-04-14 NOTE — PROVIDER CONTACT NOTE (OTHER) - ACTION/TREATMENT ORDERED:
EKG, and draw enzymes as per NP Lyudmila. EKG performed. cardiac enzymes drawn. everything within normal limit. Will continue to monitor.

## 2020-04-14 NOTE — PROGRESS NOTE ADULT - PROBLEM SELECTOR PLAN 2
PCR positive (under other MRN [id number]). No s/s of cytokine storm   - will d/c patient on rest of Plaquenil doses  - no need of escalation therapy  - Outpatient f/u with PCP, all d/c instructions given per CDC guideline

## 2020-04-14 NOTE — DISCHARGE NOTE PROVIDER - NSDCFUADDINST_GEN_ALL_CORE_FT
Please return to the ER if you develop worsening or concerning symptoms like shortness of breath, high fevers not improved with Tylenol, worsening headache, loss of consciousness, difficulty eating or drinking, chest pain, or anything else of concern to you.

## 2020-04-14 NOTE — DISCHARGE NOTE PROVIDER - CARE PROVIDER_API CALL
Khan, Mohsinuzzaman (MD)  Internal Medicine  [address] [place] [address]  Phone: [phone]  Fax: [phone]  Follow Up Time: 1 week

## 2020-04-14 NOTE — PROGRESS NOTE ADULT - SUBJECTIVE AND OBJECTIVE BOX
Mohsin Khan, MD  Attending Physician, Division Of Hospital Medicine  Pager: [phone], Office: [phone]  Off hour pager: [phone]    Patient is a 24y old  Female who presents with a chief complaint of SOB     SUBJECTIVE / OVERNIGHT EVENTS:  Seen, examined the patient this am  Sitting in bed, c/ mild SOB, no chest pain, diarrhea  O2 sat 97% in 1L, Tmax 100.5F. /67      MEDICATIONS  (STANDING):  enoxaparin Injectable 40 milliGRAM(s) SubCutaneous every 12 hours  hydroxychloroquine   Oral   hydroxychloroquine 400 milliGRAM(s) Oral every 24 hours    MEDICATIONS  (PRN):  acetaminophen   Tablet .. 650 milliGRAM(s) Oral every 4 hours PRN Temp greater or equal to 38C (100.4F), Mild Pain (1 - 3), Moderate Pain (4 - 6)  guaifenesin/dextromethorphan  Syrup 10 milliLiter(s) Oral every 4 hours PRN Cough      Vital Signs Last 24 Hrs  T(C): 37.4 (14 Apr 2020 09:20), Max: 38.1 (13 Apr 2020 18:35)  T(F): 99.3 (14 Apr 2020 09:20), Max: 100.5 (13 Apr 2020 18:35)  HR: 88 (14 Apr 2020 09:20) (76 - 97)  BP: 102/67 (14 Apr 2020 09:20) (102/62 - 113/73)  BP(mean): --  RR: 18 (14 Apr 2020 12:34) (18 - 25)  SpO2: 98% (14 Apr 2020 12:34) (97% - 99%)  CAPILLARY BLOOD GLUCOSE        I&O's Summary    13 Apr 2020 07:01  -  14 Apr 2020 07:00  --------------------------------------------------------  IN: 1440 mL / OUT: 0 mL / NET: 1440 mL    14 Apr 2020 07:01  -  14 Apr 2020 14:25  --------------------------------------------------------  IN: 640 mL / OUT: 0 mL / NET: 640 mL        PHYSICAL EXAM:-  GENERAL: NAD, well-developed  EYES: EOMI, PERRLA, conjunctiva and sclera clear  NECK: Supple, No JVD, no thyromegaly  CHEST/LUNG: Clear to auscultation bilaterally; No wheeze  HEART: Regular rate and rhythm; S1, S2 audible, No murmurs, rubs, or gallops  ABDOMEN: Soft, Nontender, Nondistended; Bowel sounds present  EXTREMITIES:  2+ Peripheral Pulses, No clubbing, cyanosis, or edema  NEURO: AAOx3, no focal deficit      LABS:    04-14    140  |  100  |  8   ----------------------------<  88  3.8   |  23  |  0.41<L>    Ca    9.2      14 Apr 2020 07:03    TPro  7.9  /  Alb  3.8  /  TBili  0.3  /  DBili  x   /  AST  28  /  ALT  19  /  AlkPhos  69  04-14      CARDIAC MARKERS ( 13 Apr 2020 22:57 )  x     / x     / 94 U/L / x     / 1.0 ng/mL    RADIOLOGY & ADDITIONAL TESTS:    Imaging Personally Reviewed: CXR

## 2020-04-14 NOTE — PROGRESS NOTE ADULT - ATTENDING COMMENTS
Family is aware, will pick her up  - Outpatient f/u with PCP, all d/c instructions given per CDC guideline  - d/c time 40 min
Luz Olson MD  Division of Hospital Medicine  Cell: [phone]  Pager: [phone]  Office: [phone]

## 2020-04-14 NOTE — DISCHARGE NOTE PROVIDER - NSDCCPCAREPLAN_GEN_ALL_CORE_FT
PRINCIPAL DISCHARGE DIAGNOSIS  Diagnosis: COVID-19  Assessment and Plan of Treatment: Complete Plaquenil as prescribed  Quarentine yourself for 14 days   Please follow up with your PCP in 1-2 weeks -Call your Provider before hand to make then aware of your hospitalization   Take Tylenol for Fevers every 6 hours as needed- Do not exceed 4gm of Tylenol in a 24 hour period  Stay hydrated - gargle with warm water, drink tea with lemon  Ambulate around the house and stay active  Take warm baths  WEAR A FACE MASK   Cover your cough and sneezes   Clean your hands often   Avoid sharing personal house hold items   Clean all high touch surfaces- everyday items like table tops , door knobs, cell phones etc   You should restrict activities outside your home except for getting medical care   Avoid using public transportation  Do not go to work, school, or Public areas   Monitor your oxygen saturation   Call [facility] of health at [phone]      SECONDARY DISCHARGE DIAGNOSES  Diagnosis: Acute hypoxemic respiratory failure  Assessment and Plan of Treatment:

## 2020-08-28 NOTE — PROGRESS NOTE ADULT - PROVIDER SPECIALTY LIST ADULT
Internal Medicine [Initial Evaluation] : an initial evaluation [Family Member] : family member [FreeTextEntry1] : Right subacute/chronic SDH

## 2022-02-10 NOTE — ED PROVIDER NOTE - MUSCULOSKELETAL NEGATIVE STATEMENT, MLM
maximum assist (25% patients effort)
no back pain, no gout, no musculoskeletal pain, no neck pain, and no weakness.

## 2025-04-13 NOTE — ED ADULT NURSE NOTE - IN THE PAST YEAR, HOW OFTEN HAVE YOU USED TOBACCO PRODUCTS?
GI CONSULT NOTE  4/13/2025  Inpatient consult to GI  Consult performed by: Yi Mckeon MD  Consult ordered by: Andria Page DO           CHIEF COMPLAINT:    Chief Complaint   Patient presents with    Vomiting       SUBJECTIVE:      62F w/ PMH ETOH abuse, arthritis, bipolar disease, RLE DVT and hx PE on eliquis--currrently on, GERD, breast cancer s/p lumpectomy, here with nausea and vomiting. Last ETOH use 5 days ago. GI consulted for abnormal CT findings.  Prolonged qtc 518  LFTs WNL  INR 1.1  UA + for barbiturates   CTA 04/12/25 w/ hepatic cirrhosis, cholelithiasis, moderate hiatal hernia and diverticulosis   CTA 03/30/25: ? Cirrhosis, NG in place, esoph thickening  - at that time was intubated and admitted at [facility] March 29 to April 5 for hypoxemic respiratory failure, found to have influenza A, possible aspiration PNA  Had egd and colonoscopy with Dr Hughes in 2014:   EGD 2014: mild alcoholic gastritis, no varices  Colon 2014: long and redundant colon  Now- says diarrhea- and in contact isolation to exclude infectious cause- this is new  No blood in stool  Feels well- tolerating po liquids   Says has not had a good appetite for the past few weeks  Was drinking heavily for a week- has been drinking heavily off and on for years  Had stopped drinking and was drinking excessive amounts of crystal light more recently    Hgb here 11.4/13  Wbc initially 11- now 5.6  Plts 158  Bun 6, cr WNL  K was 2.4- now 4.7  LFTS are normal- alb 3.4    PROBLEM LIST:    Patient Active Problem List   Diagnosis    Pulmonary embolism on right  (CMD)    Leg pain    Alcohol withdrawal syndrome, uncomplicated  (CMD)    History of deep vein thrombosis    History of pulmonary embolism    Elevated blood pressure reading    Thrombocytopenia (CMD)    Hypokalemia    Nausea and vomiting in adult    Esophageal thickening    GERD with esophagitis    Alcoholic cirrhosis of liver (CMD)    Alcohol abuse    Bipolar disorder (CMD)    Anxiety     History of suicidal ideation    History of suicide attempt    Hypomagnesemia    Hypercalcemia    Prolonged Q-T interval on ECG    Chronic diastolic CHF (congestive heart failure)  (CMD)    Suspected sleep apnea    Current use of long term anticoagulation    History of R lower extremity thrombectomy w/ balloon angioplasty on 1/17/2025 due to RLE DVT on US 1/17/2025    History of breast cancer    Class 3 severe obesity due to excess calories with serious comorbidity and body mass index (BMI) of 45.0 to 49.9 in adult (CMD)    Fibroids on CT abd pelv 9/16/2024    Thickened endometrium on CT abd pelv 4/26/2024       HISTORIES:    ALLERGIES:  No Known Allergies  Past Medical History:   Diagnosis Date    Alcohol abuse     stopped years ago, was partying snapps dranken 02/01/25    Arthritis     Bipolar I disorder, most recent episode depressed  (CMD)     Chronic pain     DVT (deep venous thrombosis)  (CMD)     Right    Gastroesophageal reflux disease     Malignant neoplasm  (CMD)     breast    Pulmonary emboli (CMD)      Past Surgical History:   Procedure Laterality Date    Breast surgery Left 2015    removed regions of breast cancer    Foot/toes surgery proc unlisted Left     Fracture toe; had several surgeries for this    Repair umbilical lenore,5+y/o,reduc      Total knee replacement Bilateral 2005     Social History     Tobacco Use    Smoking status: Never     Passive exposure: Past    Smokeless tobacco: Never   Substance Use Topics    Alcohol use: Yes     Alcohol/week: 210.0 standard drinks of alcohol     Types: 210 Cans of beer per week     Comment: Pt states she has not had alcohol in 4-5 days 4/12/2025     Drug Use:    Not Current*       Comment: THC capsule a friend gave her                 [id number]  per patient but did not                know she took it from friend    Family History   Problem Relation Age of Onset    Depression Father     Cancer Brother         hodgkins    Depression Paternal Aunt          HOME MEDICATIONS:    Medications Prior to Admission   Medication Sig Dispense Refill    chlordiazePOXIDE (LIBRIUM) 25 MG capsule Take 2 capsules by mouth every 6 hours for 1 day, THEN 1 capsule every 6 hours for 1 day, THEN 1 capsule every 12 hours for 1 day, THEN 1 capsule nightly for 1 day. 15 capsule 0    [] loperamide (IMODIUM) 2 MG capsule Take 1 capsule by mouth 4 times daily as needed for Diarrhea. 28 capsule 0    apixaBAN (ELIQUIS) 5 MG Tab Take 1 tablet by mouth every 12 hours. (Patient not taking: Reported on 2025) 60 tablet 0    traMADol (ULTRAM) 50 MG tablet Take 1 tablet by mouth every 8 hours as needed for Pain. (Patient not taking: Reported on 2025) 15 tablet 0    naLOXone (NARCAN) 4 MG/0.1ML nasal liquid Spray the content of 1 device into 1 nostril. Call 911. May repeat with 2nd device in alternate nostril if no response in 2-3 minutes. 2 each 1       INPATIENT MEDICATIONS:  Current Facility-Administered Medications   Medication    potassium CHLORIDE (KLOR-CON M) fabiola ER tablet 40 mEq    folic acid (FOLATE) tablet 1 mg    thiamine (VITAMIN B1) tablet 100 mg    LORazepam (ATIVAN) tablet 2 mg    Or    LORazepam (ATIVAN) tablet 3 mg    Or    LORazepam (ATIVAN) tablet 4 mg    Or    LORazepam (ATIVAN) injection 2 mg    Or    LORazepam (ATIVAN) injection 3 mg    Or    LORazepam (ATIVAN) injection 4 mg    acetaminophen (TYLENOL) tablet 500 mg    Or    acetaminophen (TYLENOL) suppository 325 mg    lidocaine (LIDOCARE) 4 % patch 3 patch    polyethylene glycol (MIRALAX) packet 17 g    docusate sodium-sennosides (SENOKOT S) 50-8.6 MG 2 tablet    bisacodyl (DULCOLAX) suppository 10 mg    melatonin tablet 3 mg    Potassium Standard Replacement Protocol (Levels 3.5 and lower)    Magnesium Standard Replacement Protocol    sodium chloride 0.9 % injection 10 mL    sodium chloride 0.9 % injection 2 mL    sodium chloride (NORMAL SALINE) 0.9 % bolus 500 mL    Potassium Replacement (Levels 3.6 - 4)     alum-mag hydroxide+simethicone/lidocaine viscous (2:1) (MAGIC MOUTHWASH/GI COCKTAIL) (compounded) oral suspension 15 mL    guaiFENesin-DM (ROBITUSSIN DM) 100-10 MG/5ML syrup 10 mL    hydrALAZINE (APRESOLINE) injection 10 mg    trimethobenzamide (TIGAN) injection 200 mg    diphenhydrAMINE (BENADRYL) injection 12.5 mg    benzonatate (TESSALON PERLES) capsule 200 mg    pantoprazole (PROTONIX INJECT) injection 40 mg    [Held by provider] apixaBAN (ELIQUIS) tablet 5 mg    ARIPiprazole (ABILIFY) tablet 5 mg    lactated ringers infusion    Phosphorus Standard Replacement Protocol       REVIEW OF SYSTEMS:    All other systems are reviewed and are negative except as documented in the history of present illness.    PHYSICAL EXAM:      Vital Signs: Blood pressure (!) 134/92, pulse 85, temperature 97.6 °F (36.4 °C), temperature source Oral, resp. rate 18, height 5' 9\" (1.753 m), weight (!) 139.3 kg (307 lb 1.6 oz), SpO2 98%, not currently breastfeeding.    Physical Exam  Alert, oriented, no family in room, talkative  Pulm: clear  Cv: rrr  Abd: soft, no pain with palpation  Skin- some areas of ecchymosis scattered on upper extremities- one large on on left UE  Ext: some mild edema      LABORATORY DATA and Imaging:   Recent Results (from the past 24 hours)   Comprehensive Metabolic Panel    Collection Time: 04/12/25  1:41 PM    Specimen: Blood, Venous   Result Value Ref Range    Fasting Status      Sodium 136 135 - 145 mmol/L    Potassium 2.4 (LL) 3.4 - 5.1 mmol/L    Chloride 96 (L) 97 - 110 mmol/L    Carbon Dioxide 22 21 - 32 mmol/L    Anion Gap 20 (H) 7 - 19 mmol/L    Glucose 117 (H) 70 - 99 mg/dL    BUN 6 6 - 20 mg/dL    Creatinine 0.69 0.51 - 0.95 mg/dL    Glomerular Filtration Rate >90 >=60    BUN/Cr 9 7 - 25    Calcium 10.4 (H) 8.4 - 10.2 mg/dL    Bilirubin, Total 0.8 0.2 - 1.0 mg/dL    GOT/AST 31 <=37 Units/L    GPT/ALT 31 <64 Units/L    Alkaline Phosphatase 98 45 - 117 Units/L    Albumin 3.4 3.4 - 5.0 g/dL    Protein,  Total 7.4 6.4 - 8.2 g/dL    Globulin 4.0 2.0 - 4.0 g/dL    A/G Ratio 0.9 (L) 1.0 - 2.4   Alcohol    Collection Time: 04/12/25  1:41 PM    Specimen: Blood, Venous   Result Value Ref Range    Alcohol None Detected None Detected mg/dL   CBC with Automated Differential (performable only)    Collection Time: 04/12/25  1:41 PM    Specimen: Blood, Venous   Result Value Ref Range    WBC 11.8 (H) 4.2 - 11.0 K/mcL    RBC 4.14 4.00 - 5.20 mil/mcL    HGB 13.6 12.0 - 15.5 g/dL    HCT 39.5 36.0 - 46.5 %    MCV 95.4 78.0 - 100.0 fl    MCH 32.9 26.0 - 34.0 pg    MCHC 34.4 32.0 - 36.5 g/dL    RDW-CV 14.7 11.0 - 15.0 %    RDW-SD 50.9 (H) 39.0 - 50.0 fL     140 - 450 K/mcL    NRBC 0 <=0 /100 WBC    Neutrophil, Percent 82 %    Lymphocytes, Percent 12 %    Mono, Percent 5 %    Eosinophils, Percent 0 %    Basophils, Percent 0 %    Immature Granulocytes 1 %    Absolute Neutrophils 9.7 (H) 1.8 - 7.7 K/mcL    Absolute Lymphocytes 1.4 1.0 - 4.0 K/mcL    Absolute Monocytes 0.6 0.3 - 0.9 K/mcL    Absolute Eosinophils  0.0 0.0 - 0.5 K/mcL    Absolute Basophils 0.0 0.0 - 0.3 K/mcL    Absolute Immature Granulocytes 0.1 0.0 - 0.2 K/mcL   Light Blue Top    Collection Time: 04/12/25  1:41 PM    Specimen: Blood, Venous   Result Value Ref Range    Extra Tube Hold for Add Ons    Gold Top    Collection Time: 04/12/25  1:41 PM    Specimen: Blood, Venous   Result Value Ref Range    Extra Tube Hold for Add Ons    Lipase    Collection Time: 04/12/25  1:41 PM    Specimen: Blood, Venous   Result Value Ref Range    Lipase 39 15 - 77 Units/L   Magnesium    Collection Time: 04/12/25  1:41 PM    Specimen: Blood, Venous   Result Value Ref Range    Magnesium 1.2 (L) 1.7 - 2.4 mg/dL   Phosphorus    Collection Time: 04/12/25  1:41 PM    Specimen: Blood, Venous   Result Value Ref Range    Phosphorus 3.2 2.4 - 4.7 mg/dL   Electrocardiogram 12-Lead    Collection Time: 04/12/25  2:47 PM   Result Value Ref Range    Ventricular Rate EKG/Min (BPM) 98     Atrial Rate  (BPM) 98     NE-Interval (MSEC) 164     QRS-Interval (MSEC) 72     QT-Interval (MSEC) 406     QTc 518     P Axis (Degrees) 43     R Axis (Degrees) 78     T Axis (Degrees) 8     REPORT TEXT       Normal sinus rhythm  Low voltage QRS  Prolonged QT  Abnormal ECG  When compared with ECG of  09-APR-2025 20:10,  Minimal criteria for  Anterior infarct  are no longer  present     Drug Abuse Screen, Urine    Collection Time: 04/12/25  7:27 PM    Specimen: Urine clean catch   Result Value Ref Range    Amphetamines, Urine Negative Negative    Barbiturates, Urine Positive (A) Negative    Benzodiazepines, Urine Negative Negative    Cocaine/ Metabolite, Urine Negative Negative    Opiates, Urine Negative Negative    Phencyclidine, Urine Negative Negative    Cannabinoids, Urine Negative Negative    Fentanyl, Urine Screen Negative Negative   Urinalysis With Microscopy & Culture If Indicated    Collection Time: 04/12/25  7:27 PM    Specimen: Urine clean catch   Result Value Ref Range    COLOR, URINALYSIS Colorless     APPEARANCE, URINALYSIS Clear     GLUCOSE, URINALYSIS Negative Negative mg/dL    BILIRUBIN, URINALYSIS Negative Negative    KETONES, URINALYSIS Negative Negative mg/dL    SPECIFIC GRAVITY, URINALYSIS <1.005 (L) 1.005 - 1.030    OCCULT BLOOD, URINALYSIS Small (A) Negative    PH, URINALYSIS 7.0 5.0 - 7.0    PROTEIN, URINALYSIS Negative Negative mg/dL    UROBILINOGEN, URINALYSIS 0.2 0.2, 1.0 mg/dL    NITRITE, URINALYSIS Negative Negative    LEUKOCYTE ESTERASE, URINALYSIS Negative Negative    SQUAMOUS EPITHELIAL, URINALYSIS 1 to 5 None Seen, 1 to 5 /hpf    ERYTHROCYTES, URINALYSIS 1 to 2 None Seen, 1 to 2 /hpf    LEUKOCYTES, URINALYSIS 1 to 5 None Seen, 1 to 5 /hpf    BACTERIA, URINALYSIS None Seen None Seen /hpf    HYALINE CASTS, URINALYSIS None Seen None Seen, 1 to 5 /lpf    MUCUS Present    COVID/Flu/RSV panel    Collection Time: 04/12/25 10:45 PM    Specimen: Nasal, Mid-turbinate; Swab   Result Value Ref Range    Rapid  SARS-COV-2 by PCR Not Detected Not Detected    Influenza A by PCR Not Detected Not Detected    Influenza B by PCR Not Detected Not Detected    RSV BY PCR Not Detected Not Detected    Isolation Guidelines      Procedural Comment     Magnesium    Collection Time: 04/12/25 11:15 PM    Specimen: Blood, Venous   Result Value Ref Range    Magnesium 1.3 (L) 1.7 - 2.4 mg/dL   Potassium    Collection Time: 04/12/25 11:15 PM    Specimen: Blood, Venous   Result Value Ref Range    Potassium 4.7 3.4 - 5.1 mmol/L   Blood Culture    Collection Time: 04/12/25 11:15 PM    Specimen: Blood   Result Value Ref Range    Culture, Blood or Bone Marrow No Growth <24 hours    Blood Culture    Collection Time: 04/12/25 11:17 PM    Specimen: Blood   Result Value Ref Range    Culture, Blood or Bone Marrow No Growth <24 hours    Magnesium    Collection Time: 04/13/25  4:56 AM    Specimen: Blood, Venous   Result Value Ref Range    Magnesium 1.9 1.7 - 2.4 mg/dL   Prothrombin Time (INR/PT)    Collection Time: 04/13/25  4:56 AM    Specimen: Blood, Venous   Result Value Ref Range    Protime- PT 11.6 9.7 - 11.8 sec    INR 1.1     Partial Thromboplastin Time (PTT)    Collection Time: 04/13/25  4:56 AM    Specimen: Blood, Venous   Result Value Ref Range    PTT 26 22 - 32 sec   CBC with Automated Differential (performable only)    Collection Time: 04/13/25  4:56 AM    Specimen: Blood, Venous   Result Value Ref Range    WBC 5.6 4.2 - 11.0 K/mcL    RBC 3.50 (L) 4.00 - 5.20 mil/mcL    HGB 11.4 (L) 12.0 - 15.5 g/dL    HCT 34.5 (L) 36.0 - 46.5 %    MCV 98.6 78.0 - 100.0 fl    MCH 32.6 26.0 - 34.0 pg    MCHC 33.0 32.0 - 36.5 g/dL    RDW-CV 15.4 (H) 11.0 - 15.0 %    RDW-SD 54.2 (H) 39.0 - 50.0 fL     140 - 450 K/mcL    NRBC 0 <=0 /100 WBC    Neutrophil, Percent 60 %    Lymphocytes, Percent 30 %    Mono, Percent 8 %    Eosinophils, Percent 1 %    Basophils, Percent 1 %    Immature Granulocytes 0 %    Absolute Neutrophils 3.3 1.8 - 7.7 K/mcL    Absolute  Lymphocytes 1.7 1.0 - 4.0 K/mcL    Absolute Monocytes 0.5 0.3 - 0.9 K/mcL    Absolute Eosinophils  0.1 0.0 - 0.5 K/mcL    Absolute Basophils 0.0 0.0 - 0.3 K/mcL    Absolute Immature Granulocytes 0.0 0.0 - 0.2 K/mcL   Electrocardiogram 12-Lead    Collection Time: 04/13/25  5:29 AM   Result Value Ref Range    Ventricular Rate EKG/Min (BPM) 79     Atrial Rate (BPM) 78     PA-Interval (MSEC) 182     QRS-Interval (MSEC) 78     QT-Interval (MSEC) 452     QTc 518     P Axis (Degrees) 30     R Axis (Degrees) 26     T Axis (Degrees) 6     REPORT TEXT       Undetermined rhythm  Low voltage QRS  Prolonged QT  Abnormal ECG  When compared with ECG of  12-APR-2025 14:47,  Current undetermined rhythm precludes rhythm comparison, needs review     Electrocardiogram 12-Lead    Collection Time: 04/13/25  5:47 AM   Result Value Ref Range    Ventricular Rate EKG/Min (BPM) 74     Atrial Rate (BPM) 74     PA-Interval (MSEC) 190     QRS-Interval (MSEC) 80     QT-Interval (MSEC) 474     QTc 526     P Axis (Degrees) 44     R Axis (Degrees) 21     T Axis (Degrees) 6     REPORT TEXT       Normal sinus rhythm  Low voltage QRS  Prolonged QT  Abnormal ECG  When compared with ECG of  13-APR-2025 05:29,  Previous ECG has undetermined rhythm, needs review     Comprehensive Metabolic Panel    Collection Time: 04/13/25  6:32 AM    Specimen: Blood, Venous   Result Value Ref Range    Fasting Status      Sodium 144 135 - 145 mmol/L    Potassium 2.7 (LL) 3.4 - 5.1 mmol/L    Chloride 106 97 - 110 mmol/L    Carbon Dioxide 25 21 - 32 mmol/L    Anion Gap 16 7 - 19 mmol/L    Glucose 94 70 - 99 mg/dL    BUN <5 (L) 6 - 20 mg/dL    Creatinine 0.72 0.51 - 0.95 mg/dL    Glomerular Filtration Rate >90 >=60    BUN/Cr      Calcium 8.2 (L) 8.4 - 10.2 mg/dL    Bilirubin, Total 0.6 0.2 - 1.0 mg/dL    GOT/AST 22 <=37 Units/L    GPT/ALT 25 <64 Units/L    Alkaline Phosphatase 76 45 - 117 Units/L    Albumin 2.7 (L) 3.4 - 5.0 g/dL    Protein, Total 6.0 (L) 6.4 - 8.2 g/dL     Globulin 3.3 2.0 - 4.0 g/dL    A/G Ratio 0.8 (L) 1.0 - 2.4         CT ABDOMEN PELVIS W CONTRAST   Final Result   1.   Hepatic cirrhosis.   2.   Cholelithiasis.    3.   Moderate hiatal hernia.   4.   Colonic diverticulosis.                  Electronically Signed by: KUN CHURCH MD    Signed on: 4/13/2025 12:53 AM    Workstation ID: [id number]              ASSESSMENT/PLAN:    62F w/ PMH ETOH abuse, arthritis, BP1D, DVT, GERD, breast cancer s/p lumpectomy, PE on eliquis here with nausea and vomiting. Last ETOH use 5 days ago. GI consulted for abnormal CT findings. Now- also with diarrhea  CTA April 12, 2025= concerns of liver cirrhosis, gallstones, moderate hh and diverticulosis  CTA on previous admit March 30: ?cirrhosis, presence of NG tube, esophageal thickening  Had egd/colonoscopy last in 2014- at that was drinking alcohol heavily- done by Dr Hughes- alcohol gastritis noted, no esophageal varices, colon long and redundant- no polyps removed  She is currently on eliquis- she had stopped on own while drinking alcohol  No prior liver biopsy  Unclear if truly cirrhotic- this can be worked up as outpatient- consider outpatient MR elastography - her lfts/bili are normal, her plts are wnl  Given all of the above: recommend  1) stop all alcohol, this was discussed with the patient  2) offered egd given hx of nausea, vomiting, decreased appetite for several weeks, ?cirrhosis, abnormal CT with esophageal thickening and noted HH  The risks of the egd (including the potentially life threatening risks of bleeding, perforation, missed lesions and sedation), benefits and alternatives were discussed with the patient in detail. The patient expressed understanding and agrees to proceed.   Plan for tomorrow as long as stable- to be done with my GI colleague Dr Yudy Alberts after mn  3) for diarrhea- exclude c diff- pt recently in hospital- this is new for her- ultimately recommend outpatient colonoscopy as last  colonoscopy 10 years ago   Questions answered              Thank you for involving our group in the care of this patient.  Please page us with any concerns or questions.    Electronically signed by Yi Mckeon MD       Never